# Patient Record
Sex: MALE | Race: WHITE | Employment: OTHER | URBAN - METROPOLITAN AREA
[De-identification: names, ages, dates, MRNs, and addresses within clinical notes are randomized per-mention and may not be internally consistent; named-entity substitution may affect disease eponyms.]

---

## 2019-01-29 ENCOUNTER — HOSPITAL ENCOUNTER (OUTPATIENT)
Age: 69
Setting detail: SPECIMEN
Discharge: HOME | End: 2019-01-29
Payer: COMMERCIAL

## 2019-02-04 LAB — SURGICAL PATHOLOGY REPORT: NORMAL

## 2023-06-27 ENCOUNTER — TELEPHONE (OUTPATIENT)
Dept: PRIMARY CARE CLINIC | Age: 73
End: 2023-06-27

## 2023-07-03 ENCOUNTER — OFFICE VISIT (OUTPATIENT)
Dept: PRIMARY CARE CLINIC | Age: 73
End: 2023-07-03
Payer: MEDICARE

## 2023-07-03 VITALS
WEIGHT: 178.8 LBS | SYSTOLIC BLOOD PRESSURE: 126 MMHG | HEART RATE: 80 BPM | BODY MASS INDEX: 25.6 KG/M2 | OXYGEN SATURATION: 96 % | HEIGHT: 70 IN | DIASTOLIC BLOOD PRESSURE: 80 MMHG

## 2023-07-03 DIAGNOSIS — I10 PRIMARY HYPERTENSION: ICD-10-CM

## 2023-07-03 DIAGNOSIS — L02.91 ABSCESS: ICD-10-CM

## 2023-07-03 DIAGNOSIS — E78.5 HYPERLIPIDEMIA, UNSPECIFIED HYPERLIPIDEMIA TYPE: Primary | ICD-10-CM

## 2023-07-03 DIAGNOSIS — C61 PROSTATE CANCER (HCC): ICD-10-CM

## 2023-07-03 PROCEDURE — 3079F DIAST BP 80-89 MM HG: CPT | Performed by: FAMILY MEDICINE

## 2023-07-03 PROCEDURE — G8427 DOCREV CUR MEDS BY ELIG CLIN: HCPCS | Performed by: FAMILY MEDICINE

## 2023-07-03 PROCEDURE — 4004F PT TOBACCO SCREEN RCVD TLK: CPT | Performed by: FAMILY MEDICINE

## 2023-07-03 PROCEDURE — G8419 CALC BMI OUT NRM PARAM NOF/U: HCPCS | Performed by: FAMILY MEDICINE

## 2023-07-03 PROCEDURE — 99204 OFFICE O/P NEW MOD 45 MIN: CPT | Performed by: FAMILY MEDICINE

## 2023-07-03 PROCEDURE — 3074F SYST BP LT 130 MM HG: CPT | Performed by: FAMILY MEDICINE

## 2023-07-03 PROCEDURE — 3017F COLORECTAL CA SCREEN DOC REV: CPT | Performed by: FAMILY MEDICINE

## 2023-07-03 PROCEDURE — 1123F ACP DISCUSS/DSCN MKR DOCD: CPT | Performed by: FAMILY MEDICINE

## 2023-07-03 RX ORDER — FINASTERIDE 5 MG/1
5 TABLET, FILM COATED ORAL DAILY
COMMUNITY

## 2023-07-03 RX ORDER — VENLAFAXINE HYDROCHLORIDE 75 MG/1
CAPSULE, EXTENDED RELEASE ORAL
COMMUNITY
Start: 2023-05-12

## 2023-07-03 RX ORDER — VENLAFAXINE HYDROCHLORIDE 150 MG/1
150 CAPSULE, EXTENDED RELEASE ORAL DAILY
COMMUNITY

## 2023-07-03 RX ORDER — PAROXETINE HYDROCHLORIDE 20 MG/1
20 TABLET, FILM COATED ORAL EVERY MORNING
COMMUNITY
End: 2023-07-03

## 2023-07-03 SDOH — ECONOMIC STABILITY: INCOME INSECURITY: HOW HARD IS IT FOR YOU TO PAY FOR THE VERY BASICS LIKE FOOD, HOUSING, MEDICAL CARE, AND HEATING?: NOT HARD AT ALL

## 2023-07-03 SDOH — ECONOMIC STABILITY: HOUSING INSECURITY
IN THE LAST 12 MONTHS, WAS THERE A TIME WHEN YOU DID NOT HAVE A STEADY PLACE TO SLEEP OR SLEPT IN A SHELTER (INCLUDING NOW)?: NO

## 2023-07-03 SDOH — ECONOMIC STABILITY: FOOD INSECURITY: WITHIN THE PAST 12 MONTHS, THE FOOD YOU BOUGHT JUST DIDN'T LAST AND YOU DIDN'T HAVE MONEY TO GET MORE.: NEVER TRUE

## 2023-07-03 SDOH — ECONOMIC STABILITY: FOOD INSECURITY: WITHIN THE PAST 12 MONTHS, YOU WORRIED THAT YOUR FOOD WOULD RUN OUT BEFORE YOU GOT MONEY TO BUY MORE.: NEVER TRUE

## 2023-07-03 ASSESSMENT — PATIENT HEALTH QUESTIONNAIRE - PHQ9
SUM OF ALL RESPONSES TO PHQ QUESTIONS 1-9: 0
2. FEELING DOWN, DEPRESSED OR HOPELESS: 0
SUM OF ALL RESPONSES TO PHQ QUESTIONS 1-9: 0
SUM OF ALL RESPONSES TO PHQ9 QUESTIONS 1 & 2: 0
SUM OF ALL RESPONSES TO PHQ QUESTIONS 1-9: 0
SUM OF ALL RESPONSES TO PHQ QUESTIONS 1-9: 0
1. LITTLE INTEREST OR PLEASURE IN DOING THINGS: 0

## 2023-07-03 ASSESSMENT — ENCOUNTER SYMPTOMS
SHORTNESS OF BREATH: 0
VOMITING: 0

## 2023-07-03 NOTE — PROGRESS NOTES
[address]  PRIMARY CARE  [place] [address]  Dept: [phone]  Dept Fax: [phone]    Blanca Hoover is a 67 y.o. male who presents today for his medical conditions/complaints as noted below. Blanca Hoover is c/o of  Chief Complaint   Patient presents with    New Patient         HPI:     HPI      Pt here to establish care with new pcp    Went to urgent care 6/27 and had an I and D of an infected dermoid cyst/abscess, has been draining. On keflex 500 mg Qid as well. He has appt with surgeon on 7/12. Following with Dr Lita Alan for prostate cancer, will eventually be getting surgery for it. He saw his previous PCP last month and was cleared for surgery at that time. Denies any chest pain/ shortness of breath and he is able  to go up a flight or 2 of stairs without shortness of breath. Had colonoscopy Dr. Pam Ortiz about 3 years ago. Bp has been up in past, better today. States he monitors it and is not on medication at this time. No results found for: LABA1C          ( goal A1C is < 7)   No results found for: LABMICR  No results found for: LDLCHOLESTEROL, LDLCALC    (goal LDL is <100)   No results found for: AST, ALT, BUN, CR  BP Readings from Last 3 Encounters:   07/03/23 126/80          (goal 120/80)    No past medical history on file. No past surgical history on file. No family history on file.     Social History     Tobacco Use    Smoking status: Former     Types: Cigarettes    Smokeless tobacco: Never   Substance Use Topics    Alcohol use: Not on file      Current Outpatient Medications   Medication Sig Dispense Refill    finasteride (PROSCAR) 5 MG tablet Take 1 tablet by mouth daily      venlafaxine (EFFEXOR XR) 75 MG extended release capsule TAKE 1 CAPSULE BY MOUTH EVERY DAY WITH FOOD      venlafaxine (EFFEXOR XR) 150 MG extended release capsule Take 1 capsule by mouth daily       No current

## 2023-07-11 ENCOUNTER — HOSPITAL ENCOUNTER (OUTPATIENT)
Age: 73
Setting detail: SPECIMEN
Discharge: HOME | End: 2023-07-11

## 2023-07-11 DIAGNOSIS — I10 PRIMARY HYPERTENSION: ICD-10-CM

## 2023-07-11 DIAGNOSIS — E78.5 HYPERLIPIDEMIA, UNSPECIFIED HYPERLIPIDEMIA TYPE: ICD-10-CM

## 2023-07-11 LAB
ALBUMIN SERPL-MCNC: 4.3 G/DL (ref 3.5–5.2)
ALBUMIN/GLOB SERPL: 1.4 {RATIO} (ref 1–2.5)
ALP SERPL-CCNC: 86 U/L (ref 40–129)
ALT SERPL-CCNC: 11 U/L (ref 5–41)
ANION GAP SERPL CALCULATED.3IONS-SCNC: 12 MMOL/L (ref 9–17)
AST SERPL-CCNC: 29 U/L
BASOPHILS # BLD: 0.04 K/UL (ref 0–0.2)
BASOPHILS NFR BLD: 1 % (ref 0–2)
BILIRUB SERPL-MCNC: 0.6 MG/DL (ref 0.3–1.2)
BUN SERPL-MCNC: 17 MG/DL (ref 8–23)
CALCIUM SERPL-MCNC: 9.6 MG/DL (ref 8.6–10.4)
CHLORIDE SERPL-SCNC: 100 MMOL/L (ref 98–107)
CHOLEST SERPL-MCNC: 202 MG/DL
CHOLESTEROL/HDL RATIO: 4.2
CO2 SERPL-SCNC: 25 MMOL/L (ref 20–31)
CREAT SERPL-MCNC: 0.9 MG/DL (ref 0.7–1.2)
EOSINOPHIL # BLD: 0.17 K/UL (ref 0–0.44)
EOSINOPHILS RELATIVE PERCENT: 2 % (ref 1–4)
ERYTHROCYTE [DISTWIDTH] IN BLOOD BY AUTOMATED COUNT: 13.4 % (ref 11.8–14.4)
GFR SERPL CREATININE-BSD FRML MDRD: >60 ML/MIN/1.73M2
GLUCOSE SERPL-MCNC: 77 MG/DL (ref 70–99)
HCT VFR BLD AUTO: 45.9 % (ref 40.7–50.3)
HDLC SERPL-MCNC: 48 MG/DL
HGB BLD-MCNC: 14.6 G/DL (ref 13–17)
IMM GRANULOCYTES # BLD AUTO: 0.03 K/UL (ref 0–0.3)
IMM GRANULOCYTES NFR BLD: 0 %
LDLC SERPL CALC-MCNC: 127 MG/DL (ref 0–130)
LYMPHOCYTES # BLD: 25 % (ref 24–43)
LYMPHOCYTES NFR BLD: 1.99 K/UL (ref 1.1–3.7)
MCH RBC QN AUTO: 29.7 PG (ref 25.2–33.5)
MCHC RBC AUTO-ENTMCNC: 31.8 G/DL (ref 28.4–34.8)
MCV RBC AUTO: 93.5 FL (ref 82.6–102.9)
MONOCYTES NFR BLD: 0.53 K/UL (ref 0.1–1.2)
MONOCYTES NFR BLD: 7 % (ref 3–12)
NEUTROPHILS NFR BLD: 65 % (ref 36–65)
NEUTS SEG NFR BLD: 5.1 K/UL (ref 1.5–8.1)
NRBC BLD-RTO: 0 PER 100 WBC
PLATELET # BLD AUTO: 272 K/UL (ref 138–453)
PMV BLD AUTO: 9.6 FL (ref 8.1–13.5)
POTASSIUM SERPL-SCNC: 4.6 MMOL/L (ref 3.7–5.3)
PROT SERPL-MCNC: 7.4 G/DL (ref 6.4–8.3)
RBC # BLD AUTO: 4.91 M/UL (ref 4.21–5.77)
SODIUM SERPL-SCNC: 137 MMOL/L (ref 135–144)
TRIGL SERPL-MCNC: 137 MG/DL
WBC OTHER # BLD: 7.9 K/UL (ref 3.5–11.3)

## 2023-07-31 ENCOUNTER — TELEPHONE (OUTPATIENT)
Dept: PRIMARY CARE CLINIC | Age: 73
End: 2023-07-31

## 2023-07-31 NOTE — TELEPHONE ENCOUNTER
Patient's wife called, he had a cyst removed from his back and wife has been covering area with gauze and paper tape but where tape is, very red, irritated, and itchy. She states the incision area is healing well, using gold bond cream on irritated tape area and looking better. She is asking if he can leave off gauze now since area is healing, writer advised okay as long is not draining and keeps clean and dry. Advised wife Orlizluzma Tong would send message to provider and see if agrees this is okay to do. If okay, wife does not need a call back.

## 2023-07-31 NOTE — TELEPHONE ENCOUNTER
Call made to the patient's wife to inform them of the patient's PCP's instructions, writer unable to leave a voice message due to the patient's wife's number on file being disconnected.

## 2023-08-17 RX ORDER — VENLAFAXINE HYDROCHLORIDE 75 MG/1
CAPSULE, EXTENDED RELEASE ORAL
Qty: 90 CAPSULE | Refills: 0 | Status: SHIPPED | OUTPATIENT
Start: 2023-08-17

## 2023-08-17 RX ORDER — VENLAFAXINE HYDROCHLORIDE 150 MG/1
150 CAPSULE, EXTENDED RELEASE ORAL DAILY
Qty: 90 CAPSULE | Refills: 0 | Status: SHIPPED | OUTPATIENT
Start: 2023-08-17

## 2023-08-23 ENCOUNTER — TELEPHONE (OUTPATIENT)
Dept: PRIMARY CARE CLINIC | Age: 73
End: 2023-08-23

## 2023-08-23 NOTE — TELEPHONE ENCOUNTER
Patient's wife Lalito Crowe called in stating that patient is having an allergic reaction (rash) from the tape/bandage used to cover up an abscess patient had on his back. He saw a surgeon 2 months ago and although the wound healed, the rash has developed. They called to schedule an appointment with Dr Michelle Payan but Dr Michelle Payan does not have availability til October. Writer offered the walk in clinic and provided information for the walk in clinic to Lalito Crowe. She verbalized understanding.  Will route to Dr Michelle Payan for [facility]

## 2023-09-20 ENCOUNTER — HOSPITAL ENCOUNTER (OUTPATIENT)
Age: 73
Setting detail: SPECIMEN
Discharge: HOME | End: 2023-09-20

## 2023-09-25 LAB — DERMATOLOGY PATHOLOGY REPORT: NORMAL

## 2023-10-03 ENCOUNTER — OFFICE VISIT (OUTPATIENT)
Dept: PRIMARY CARE CLINIC | Age: 73
End: 2023-10-03
Payer: MEDICARE

## 2023-10-03 VITALS
SYSTOLIC BLOOD PRESSURE: 124 MMHG | HEIGHT: 70 IN | DIASTOLIC BLOOD PRESSURE: 82 MMHG | OXYGEN SATURATION: 99 % | HEART RATE: 84 BPM | WEIGHT: 178 LBS | BODY MASS INDEX: 25.48 KG/M2

## 2023-10-03 DIAGNOSIS — Z00.00 MEDICARE ANNUAL WELLNESS VISIT, SUBSEQUENT: Primary | ICD-10-CM

## 2023-10-03 DIAGNOSIS — C61 PROSTATE CANCER (HCC): ICD-10-CM

## 2023-10-03 DIAGNOSIS — H91.93 BILATERAL HEARING LOSS, UNSPECIFIED HEARING LOSS TYPE: ICD-10-CM

## 2023-10-03 PROCEDURE — G0439 PPPS, SUBSEQ VISIT: HCPCS | Performed by: FAMILY MEDICINE

## 2023-10-03 PROCEDURE — 3017F COLORECTAL CA SCREEN DOC REV: CPT | Performed by: FAMILY MEDICINE

## 2023-10-03 PROCEDURE — 1123F ACP DISCUSS/DSCN MKR DOCD: CPT | Performed by: FAMILY MEDICINE

## 2023-10-03 PROCEDURE — G8484 FLU IMMUNIZE NO ADMIN: HCPCS | Performed by: FAMILY MEDICINE

## 2023-10-03 ASSESSMENT — PATIENT HEALTH QUESTIONNAIRE - PHQ9
SUM OF ALL RESPONSES TO PHQ QUESTIONS 1-9: 0
SUM OF ALL RESPONSES TO PHQ9 QUESTIONS 1 & 2: 0
SUM OF ALL RESPONSES TO PHQ QUESTIONS 1-9: 0
SUM OF ALL RESPONSES TO PHQ QUESTIONS 1-9: 0
1. LITTLE INTEREST OR PLEASURE IN DOING THINGS: 0
2. FEELING DOWN, DEPRESSED OR HOPELESS: 0
SUM OF ALL RESPONSES TO PHQ QUESTIONS 1-9: 0

## 2023-10-03 ASSESSMENT — LIFESTYLE VARIABLES
HOW MANY STANDARD DRINKS CONTAINING ALCOHOL DO YOU HAVE ON A TYPICAL DAY: PATIENT DOES NOT DRINK
HOW OFTEN DO YOU HAVE A DRINK CONTAINING ALCOHOL: NEVER

## 2023-10-03 NOTE — PATIENT INSTRUCTIONS
months. Try to get at least 150 minutes of exercise per week or 10,000 steps per day on a pedometer . Order or download the FREE \"Exercise & Physical Activity: Your Everyday Guide\" from [employer]. Call 8-318.721.8074 or search [employer] online. You need 8196-2807 mg of calcium and 9471-4655 IU of vitamin D per day. It is possible to meet your calcium requirement with diet alone, but a vitamin D supplement is usually necessary to meet this goal.  When exposed to the sun, use a sunscreen that protects against both UVA and UVB radiation with an SPF of 30 or greater. Reapply every 2 to 3 hours or after sweating, drying off with a towel, or swimming. Always wear a seat belt when traveling in a car. Always wear a helmet when riding a bicycle or motorcycle.

## 2023-10-03 NOTE — PROGRESS NOTES
Medicare Annual Wellness Visit    Nathanael Baumgarten is here for Medicare AWV (Due for colonoscopy, PSA, AAA) and Other (Pt tries to talk too fast and loses train of thought)    Assessment & Plan   Medicare annual wellness visit, subsequent  Bilateral hearing loss, unspecified hearing loss type  -     [facility] - Stephanie Ibrahim MD, Otolaryngology, [place]  Prostate cancer [facility])    Recommendations for Preventive Services Due: see orders and patient instructions/AVS.  Recommended screening schedule for the next 5-10 years is provided to the patient in written form: see Patient Instructions/AVS.     Return in about 6 months (around 4/3/2024) for follow up. Subjective       Patient's complete Health Risk Assessment and screening values have been reviewed and are found in Flowsheets. The following problems were reviewed today and where indicated follow up appointments were made and/or referrals ordered. Following with urology for prostate cancer. Will be getting high intensity focus US for his prostate. Had sebaceous cyst removed, healed well. Had flu, covid, and pneumonia vaccine. Pt states he talks too fast and forgets what he is about to say. Dr Christo Marie did last colonoscopy few years ago. Thinks may have had AAA screening in past.       Positive Risk Factor Screenings with Interventions:                   Hearing Screen:  Do you or your family notice any trouble with your hearing that hasn't been managed with hearing aids?: (!) Yes    Interventions:  Has been tested in past. Does not wearing hearing aids. 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 Vision Screen:  Do you have difficulty driving, watching TV, or doing any of your daily activities because of your eyesight?: No  Have you had an eye exam within the past year?: (!) No  No results found. Interventions: Will be getting eye exam.    Safety:  Do all of your stairways have a railing or banister?: (!) No  Interventions:  Stairs have railing.

## 2023-11-13 ENCOUNTER — OFFICE VISIT (OUTPATIENT)
Dept: FAMILY MEDICINE CLINIC | Age: 73
End: 2023-11-13
Payer: MEDICARE

## 2023-11-13 VITALS
WEIGHT: 179 LBS | RESPIRATION RATE: 14 BRPM | SYSTOLIC BLOOD PRESSURE: 124 MMHG | TEMPERATURE: 98.1 F | DIASTOLIC BLOOD PRESSURE: 82 MMHG | BODY MASS INDEX: 26.05 KG/M2 | HEART RATE: 89 BPM | OXYGEN SATURATION: 99 %

## 2023-11-13 DIAGNOSIS — J01.10 ACUTE NON-RECURRENT FRONTAL SINUSITIS: Primary | ICD-10-CM

## 2023-11-13 PROCEDURE — 3017F COLORECTAL CA SCREEN DOC REV: CPT | Performed by: NURSE PRACTITIONER

## 2023-11-13 PROCEDURE — G8419 CALC BMI OUT NRM PARAM NOF/U: HCPCS | Performed by: NURSE PRACTITIONER

## 2023-11-13 PROCEDURE — 99213 OFFICE O/P EST LOW 20 MIN: CPT | Performed by: NURSE PRACTITIONER

## 2023-11-13 PROCEDURE — G8484 FLU IMMUNIZE NO ADMIN: HCPCS | Performed by: NURSE PRACTITIONER

## 2023-11-13 PROCEDURE — G8427 DOCREV CUR MEDS BY ELIG CLIN: HCPCS | Performed by: NURSE PRACTITIONER

## 2023-11-13 PROCEDURE — 1123F ACP DISCUSS/DSCN MKR DOCD: CPT | Performed by: NURSE PRACTITIONER

## 2023-11-13 PROCEDURE — 1036F TOBACCO NON-USER: CPT | Performed by: NURSE PRACTITIONER

## 2023-11-13 RX ORDER — PAROXETINE HYDROCHLORIDE 20 MG/1
20 TABLET, FILM COATED ORAL EVERY MORNING
Qty: 90 TABLET | Refills: 0 | Status: SHIPPED | OUTPATIENT
Start: 2023-11-13

## 2023-11-13 RX ORDER — PREDNISONE 20 MG/1
20 TABLET ORAL 2 TIMES DAILY
Qty: 10 TABLET | Refills: 0 | Status: SHIPPED | OUTPATIENT
Start: 2023-11-13 | End: 2023-11-18

## 2023-11-13 ASSESSMENT — ENCOUNTER SYMPTOMS
SINUS PRESSURE: 1
VOMITING: 0
COUGH: 0
EYE PAIN: 0
NAUSEA: 0

## 2023-11-13 NOTE — PROGRESS NOTES
MEDICAL DECISION MAKING Assessment/Plan:     Di Roa was seen today for nasal congestion and headache. 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 No cyst epithelial lining seen. Feli Rapp, Harini Baxter  **Electronically Signed Out**         /9/25/2023     Clinical Information  Operative Findings:  CYST UPPER BACK  Operation Performed:  EXCISION  tm         Source of Specimen  A: JAR#1- CYST UPPER BACK      Gross Description  \"LAUREL TORRES, CYST BACK (UPPER)\" Received in formalin is a 2.5 x  0.5 x 1.0 (depth) cm unoriented tan skin ellipse with a separate 0.3 x  0.3 x 0.1 cm tan-white fragment. Sectioning reveals rubbery fibrotic  cut surfaces. Entirely 1cs. tm    LRP/tb1:9/21/2023  Microscopic Description  1 H&E reviewed. Microscopic examination performed. Processing Lab:  [address]  [facility], [facility]  Interpretation Performed at [address], [address]       Patient Name: Zora Chavez [facility] Rec: [id number]  [address] [place], [place] [address]James Santos. [address]  [phone]  Fax: [phone]       Based on the history and exam, will treat as frontal sinusitis with short course of oral prednisone. Increase fluid intake. Pt to return if symptoms are not improving or worsening. Go to the ER for any emergent concern.        Patient

## 2023-11-22 RX ORDER — VENLAFAXINE HYDROCHLORIDE 150 MG/1
150 CAPSULE, EXTENDED RELEASE ORAL DAILY
Qty: 90 CAPSULE | Refills: 1 | Status: SHIPPED | OUTPATIENT
Start: 2023-11-22

## 2023-11-22 RX ORDER — VENLAFAXINE HYDROCHLORIDE 75 MG/1
CAPSULE, EXTENDED RELEASE ORAL
Qty: 90 CAPSULE | Refills: 1 | Status: SHIPPED | OUTPATIENT
Start: 2023-11-22

## 2023-12-07 ENCOUNTER — OFFICE VISIT (OUTPATIENT)
Dept: FAMILY MEDICINE CLINIC | Age: 73
End: 2023-12-07
Payer: MEDICARE

## 2023-12-07 VITALS
RESPIRATION RATE: 14 BRPM | OXYGEN SATURATION: 96 % | HEART RATE: 99 BPM | TEMPERATURE: 97.9 F | DIASTOLIC BLOOD PRESSURE: 80 MMHG | SYSTOLIC BLOOD PRESSURE: 122 MMHG

## 2023-12-07 DIAGNOSIS — B96.89 ACUTE BACTERIAL SINUSITIS: Primary | ICD-10-CM

## 2023-12-07 DIAGNOSIS — J01.90 ACUTE BACTERIAL SINUSITIS: Primary | ICD-10-CM

## 2023-12-07 PROBLEM — C61 MALIGNANT NEOPLASM OF PROSTATE (HCC): Status: ACTIVE | Noted: 2023-12-07

## 2023-12-07 PROCEDURE — 1036F TOBACCO NON-USER: CPT | Performed by: NURSE PRACTITIONER

## 2023-12-07 PROCEDURE — 3017F COLORECTAL CA SCREEN DOC REV: CPT | Performed by: NURSE PRACTITIONER

## 2023-12-07 PROCEDURE — G8427 DOCREV CUR MEDS BY ELIG CLIN: HCPCS | Performed by: NURSE PRACTITIONER

## 2023-12-07 PROCEDURE — 1123F ACP DISCUSS/DSCN MKR DOCD: CPT | Performed by: NURSE PRACTITIONER

## 2023-12-07 PROCEDURE — G8484 FLU IMMUNIZE NO ADMIN: HCPCS | Performed by: NURSE PRACTITIONER

## 2023-12-07 PROCEDURE — 99213 OFFICE O/P EST LOW 20 MIN: CPT | Performed by: NURSE PRACTITIONER

## 2023-12-07 PROCEDURE — G8419 CALC BMI OUT NRM PARAM NOF/U: HCPCS | Performed by: NURSE PRACTITIONER

## 2023-12-07 RX ORDER — PREDNISONE 20 MG/1
20 TABLET ORAL 2 TIMES DAILY
Qty: 10 TABLET | Refills: 0 | Status: SHIPPED | OUTPATIENT
Start: 2023-12-07 | End: 2023-12-12

## 2023-12-07 RX ORDER — AMOXICILLIN AND CLAVULANATE POTASSIUM 875; 125 MG/1; MG/1
1 TABLET, FILM COATED ORAL 2 TIMES DAILY
Qty: 20 TABLET | Refills: 0 | Status: SHIPPED | OUTPATIENT
Start: 2023-12-07 | End: 2023-12-17

## 2023-12-07 ASSESSMENT — ENCOUNTER SYMPTOMS
VOMITING: 0
COUGH: 0
EYE REDNESS: 0
SINUS PRESSURE: 1
EYE PAIN: 0
NAUSEA: 0
SINUS PAIN: 1